# Patient Record
Sex: FEMALE | Race: BLACK OR AFRICAN AMERICAN | NOT HISPANIC OR LATINO | URBAN - METROPOLITAN AREA
[De-identification: names, ages, dates, MRNs, and addresses within clinical notes are randomized per-mention and may not be internally consistent; named-entity substitution may affect disease eponyms.]

---

## 2019-12-09 PROBLEM — Z00.00 ENCOUNTER FOR PREVENTIVE HEALTH EXAMINATION: Status: ACTIVE | Noted: 2019-12-09

## 2022-05-22 ENCOUNTER — OUTPATIENT (OUTPATIENT)
Dept: OUTPATIENT SERVICES | Facility: HOSPITAL | Age: 32
LOS: 1 days | Discharge: HOME | End: 2022-05-22

## 2022-05-22 DIAGNOSIS — D72.829 ELEVATED WHITE BLOOD CELL COUNT, UNSPECIFIED: ICD-10-CM

## 2022-05-25 ENCOUNTER — RESULT REVIEW (OUTPATIENT)
Age: 32
End: 2022-05-25

## 2022-05-25 ENCOUNTER — APPOINTMENT (OUTPATIENT)
Dept: HEMATOLOGY ONCOLOGY | Facility: CLINIC | Age: 32
End: 2022-05-25
Payer: MEDICAID

## 2022-05-25 ENCOUNTER — NON-APPOINTMENT (OUTPATIENT)
Age: 32
End: 2022-05-25

## 2022-05-25 VITALS
HEART RATE: 74 BPM | OXYGEN SATURATION: 98 % | DIASTOLIC BLOOD PRESSURE: 80 MMHG | HEIGHT: 64 IN | WEIGHT: 229.99 LBS | TEMPERATURE: 97 F | BODY MASS INDEX: 39.26 KG/M2 | RESPIRATION RATE: 16 BRPM | SYSTOLIC BLOOD PRESSURE: 120 MMHG

## 2022-05-25 DIAGNOSIS — E66.01 MORBID (SEVERE) OBESITY DUE TO EXCESS CALORIES: ICD-10-CM

## 2022-05-25 DIAGNOSIS — Z78.9 OTHER SPECIFIED HEALTH STATUS: ICD-10-CM

## 2022-05-25 DIAGNOSIS — Z86.79 PERSONAL HISTORY OF OTHER DISEASES OF THE CIRCULATORY SYSTEM: ICD-10-CM

## 2022-05-25 LAB
BASOPHILS # BLD AUTO: 0.07 K/UL — SIGNIFICANT CHANGE UP (ref 0–0.2)
BASOPHILS NFR BLD AUTO: 0.9 % — SIGNIFICANT CHANGE UP (ref 0–2)
EOSINOPHIL # BLD AUTO: 0.46 K/UL — SIGNIFICANT CHANGE UP (ref 0–0.5)
EOSINOPHIL NFR BLD AUTO: 5.7 % — SIGNIFICANT CHANGE UP (ref 0–6)
HCT VFR BLD CALC: 40.2 % — SIGNIFICANT CHANGE UP (ref 34.5–45)
HGB BLD-MCNC: 13.5 G/DL — SIGNIFICANT CHANGE UP (ref 11.5–15.5)
IMM GRANULOCYTES NFR BLD AUTO: 0.2 % — SIGNIFICANT CHANGE UP (ref 0–1.5)
LYMPHOCYTES # BLD AUTO: 1.95 K/UL — SIGNIFICANT CHANGE UP (ref 1–3.3)
LYMPHOCYTES # BLD AUTO: 24 % — SIGNIFICANT CHANGE UP (ref 13–44)
MCHC RBC-ENTMCNC: 31 PG — SIGNIFICANT CHANGE UP (ref 27–34)
MCHC RBC-ENTMCNC: 33.6 G/DL — SIGNIFICANT CHANGE UP (ref 32–36)
MCV RBC AUTO: 92.2 FL — SIGNIFICANT CHANGE UP (ref 80–100)
MONOCYTES # BLD AUTO: 0.65 K/UL — SIGNIFICANT CHANGE UP (ref 0–0.9)
MONOCYTES NFR BLD AUTO: 8 % — SIGNIFICANT CHANGE UP (ref 2–14)
NEUTROPHILS # BLD AUTO: 4.99 K/UL — SIGNIFICANT CHANGE UP (ref 1.8–7.4)
NEUTROPHILS NFR BLD AUTO: 61.2 % — SIGNIFICANT CHANGE UP (ref 43–77)
NRBC # BLD: 0 /100 WBCS — SIGNIFICANT CHANGE UP (ref 0–0)
PLATELET # BLD AUTO: 325 K/UL — SIGNIFICANT CHANGE UP (ref 150–400)
RBC # BLD: 4.36 M/UL — SIGNIFICANT CHANGE UP (ref 3.8–5.2)
RBC # FLD: 11.9 % — SIGNIFICANT CHANGE UP (ref 10.3–14.5)
WBC # BLD: 8.14 K/UL — SIGNIFICANT CHANGE UP (ref 3.8–10.5)
WBC # FLD AUTO: 8.14 K/UL — SIGNIFICANT CHANGE UP (ref 3.8–10.5)

## 2022-05-25 PROCEDURE — 99205 OFFICE O/P NEW HI 60 MIN: CPT

## 2022-05-25 NOTE — RESULTS/DATA
[FreeTextEntry1] : 5/25/2022\par Accompanied scanned labs reviewed.\par Unremarkable except mild leukocytosis (WBC 10.9-11.2), mild lymphocytosis\par

## 2022-05-25 NOTE — HISTORY OF PRESENT ILLNESS
[de-identified] : (5/25/2022) 31 year-old Ms. BURNETTE is seen in consult for leukocytosis. Patient's has had a blood work at her endocrinologist's office in March 2022 when boarder line leukocytosis was noted, contributed mainly by high normal neutrophils and lymphocytes (ALC 3.44) . Patient denies any infection of fever at that time. She however suffered COVID in April 2022. Patient has no knowledge of leukocytosis in the past. She had a blood work done more than 3 years ago but she does not remember if she had leukocytosis at that time. Patient denies any chronic infection or inflammation. Of note she has obesity and is working on weight reduction currently and has lost 40 lbs since January 2022 in preparation for breast reduction surgery. Patient also has PCOS diagnosed at the age of 20..

## 2022-05-25 NOTE — CONSULT LETTER
[Dear  ___] : Dear  [unfilled], [Consult Letter:] : I had the pleasure of evaluating your patient, [unfilled]. [Please see my note below.] : Please see my note below. [Consult Closing:] : Thank you very much for allowing me to participate in the care of this patient.  If you have any questions, please do not hesitate to contact me. [Sincerely,] : Sincerely, [FreeTextEntry3] : Wolf Maciel MD, PhD, MS \par Attending Physician \par Hematology-Oncology \par [facility]\par

## 2022-05-25 NOTE — ASSESSMENT
[FreeTextEntry1] : 31 year-old Ms. BURNETTE is seen in consult for boarder line leukocytosis (10.9-11.2) contributed mainly by high normal neutrophils and touch high lymphocytes. She has no signs of infection. Of note, the patient has been diagnosed with PCOS at the age of 20. She is obese. These factors will cause her WBC count to be high or high normal. Do not suspect any neoplastic process. No hematologic intervention is indicated. Follow up with surveillance. \par \par [] Leukocytosis\par - High normal neutrophils and touch high lymphocytes\par - Likely related ot obesity and PCOS\par - No hematologic intervention needed \par - Follow up with surveillance \par - CBC, LDH today \par - RTC in 3 months, PRE-F

## 2022-05-26 ENCOUNTER — NON-APPOINTMENT (OUTPATIENT)
Age: 32
End: 2022-05-26

## 2022-05-26 LAB
ALBUMIN SERPL ELPH-MCNC: 4.4 G/DL
ALP BLD-CCNC: 83 U/L
ALT SERPL-CCNC: 20 U/L
ANION GAP SERPL CALC-SCNC: 13 MMOL/L
AST SERPL-CCNC: 20 U/L
BILIRUB SERPL-MCNC: 0.4 MG/DL
BUN SERPL-MCNC: 15 MG/DL
CALCIUM SERPL-MCNC: 9.6 MG/DL
CHLORIDE SERPL-SCNC: 104 MMOL/L
CO2 SERPL-SCNC: 23 MMOL/L
CREAT SERPL-MCNC: 0.74 MG/DL
EGFR: 112 ML/MIN/1.73M2
GLUCOSE SERPL-MCNC: 103 MG/DL
LDH SERPL-CCNC: 164 U/L
POTASSIUM SERPL-SCNC: 4.2 MMOL/L
PROT SERPL-MCNC: 7.8 G/DL
SODIUM SERPL-SCNC: 139 MMOL/L

## 2022-08-25 ENCOUNTER — OUTPATIENT (OUTPATIENT)
Dept: OUTPATIENT SERVICES | Facility: HOSPITAL | Age: 32
LOS: 1 days | Discharge: HOME | End: 2022-08-25

## 2022-08-25 ENCOUNTER — APPOINTMENT (OUTPATIENT)
Dept: HEMATOLOGY ONCOLOGY | Facility: CLINIC | Age: 32
End: 2022-08-25

## 2022-08-25 VITALS
HEART RATE: 78 BPM | TEMPERATURE: 97.5 F | DIASTOLIC BLOOD PRESSURE: 93 MMHG | OXYGEN SATURATION: 97 % | RESPIRATION RATE: 16 BRPM | SYSTOLIC BLOOD PRESSURE: 143 MMHG | WEIGHT: 247.36 LBS | BODY MASS INDEX: 42.46 KG/M2

## 2022-08-25 DIAGNOSIS — D72.829 ELEVATED WHITE BLOOD CELL COUNT, UNSPECIFIED: ICD-10-CM

## 2022-08-25 DIAGNOSIS — Z87.42 PERSONAL HISTORY OF OTHER DISEASES OF THE FEMALE GENITAL TRACT: ICD-10-CM

## 2022-08-25 PROCEDURE — 99213 OFFICE O/P EST LOW 20 MIN: CPT

## 2022-08-25 NOTE — HISTORY OF PRESENT ILLNESS
[de-identified] : (5/25/2022) 31 year-old Ms. BURNETTE is seen in consult for leukocytosis. Patient's has had a blood work at her endocrinologist's office in March 2022 when boarder line leukocytosis was noted, contributed mainly by high normal neutrophils and lymphocytes (ALC 3.44) . Patient denies any infection of fever at that time. She however suffered COVID in April 2022. Patient has no knowledge of leukocytosis in the past. She had a blood work done more than 3 years ago but she does not remember if she had leukocytosis at that time. Patient denies any chronic infection or inflammation. Of note she has obesity and is working on weight reduction currently and has lost 40 lbs since January 2022 in preparation for breast reduction surgery. Patient also has PCOS diagnosed at the age of 20..  [de-identified] : 8/25/2022\par 31 year-old Ms. BURNETTE is seen in follow up for mild leukocytosis. Patient's CBC was perfect on the day of initial visit. She has no complaints and denies any change in her health history.  Denies any infection, fever.

## 2022-08-25 NOTE — RESULTS/DATA
[FreeTextEntry1] : 8/25/2022\par CBC from last visit (5/25/22) was perfect including differentials. \par \par 5/25/2022\par Accompanied scanned labs reviewed.\par Unremarkable except mild leukocytosis (WBC 10.9-11.2), mild lymphocytosis\par

## 2022-08-25 NOTE — ASSESSMENT
[FreeTextEntry1] : 31 year-old Ms. BURNETTE is seen in consult for boarder line leukocytosis (10.9-11.2) contributed mainly by high normal neutrophils and touch high lymphocytes. She has no signs of infection. Of note, the patient has been diagnosed with PCOS at the age of 20. She is obese. These factors will cause her WBC count to be high or high normal. Do not suspect any neoplastic process. No hematologic intervention is indicated. Follow up with surveillance. \par \par \par \par [] Leukocytosis\par - High normal neutrophils and touch high lymphocytes\par - Likely related to obesity and PCOS \par - Last CBC including diff are WNL\par - No hematologic intervention needed \par - No further hematology follow up needed\par - Reconsult if needed

## 2022-08-25 NOTE — CONSULT LETTER
[Dear  ___] : Dear  [unfilled], [Consult Letter:] : I had the pleasure of evaluating your patient, [unfilled]. [Please see my note below.] : Please see my note below. [Consult Closing:] : Thank you very much for allowing me to participate in the care of this patient.  If you have any questions, please do not hesitate to contact me. [Sincerely,] : Sincerely, [FreeTextEntry3] : Wolf Maciel MD, PhD, MS \par Attending Physician \par Hematology-Oncology \par [facility]\par

## 2022-08-27 ENCOUNTER — TRANSCRIPTION ENCOUNTER (OUTPATIENT)
Age: 32
End: 2022-08-27